# Patient Record
Sex: FEMALE | Race: ASIAN | NOT HISPANIC OR LATINO | URBAN - METROPOLITAN AREA
[De-identification: names, ages, dates, MRNs, and addresses within clinical notes are randomized per-mention and may not be internally consistent; named-entity substitution may affect disease eponyms.]

---

## 2019-08-15 ENCOUNTER — EMERGENCY (EMERGENCY)
Facility: HOSPITAL | Age: 39
LOS: 1 days | Discharge: HOME | End: 2019-08-15
Attending: EMERGENCY MEDICINE
Payer: MEDICAID

## 2019-08-15 VITALS
TEMPERATURE: 99 F | DIASTOLIC BLOOD PRESSURE: 68 MMHG | OXYGEN SATURATION: 100 % | HEART RATE: 73 BPM | RESPIRATION RATE: 18 BRPM | SYSTOLIC BLOOD PRESSURE: 101 MMHG

## 2019-08-15 VITALS
HEIGHT: 63 IN | HEART RATE: 83 BPM | TEMPERATURE: 98 F | WEIGHT: 110.01 LBS | SYSTOLIC BLOOD PRESSURE: 101 MMHG | DIASTOLIC BLOOD PRESSURE: 69 MMHG | OXYGEN SATURATION: 99 % | RESPIRATION RATE: 18 BRPM

## 2019-08-15 LAB
ALBUMIN SERPL ELPH-MCNC: 4.3 G/DL — SIGNIFICANT CHANGE UP (ref 3.3–5)
ALP SERPL-CCNC: 56 U/L — SIGNIFICANT CHANGE UP (ref 40–120)
ALT FLD-CCNC: 12 U/L — SIGNIFICANT CHANGE UP (ref 10–45)
ANION GAP SERPL CALC-SCNC: 9 MMOL/L — SIGNIFICANT CHANGE UP (ref 5–17)
APPEARANCE UR: CLEAR — SIGNIFICANT CHANGE UP
AST SERPL-CCNC: 25 U/L — SIGNIFICANT CHANGE UP (ref 10–40)
BACTERIA # UR AUTO: NEGATIVE — SIGNIFICANT CHANGE UP
BASOPHILS # BLD AUTO: 0 K/UL — SIGNIFICANT CHANGE UP (ref 0–0.2)
BASOPHILS NFR BLD AUTO: 0.5 % — SIGNIFICANT CHANGE UP (ref 0–2)
BILIRUB SERPL-MCNC: 0.5 MG/DL — SIGNIFICANT CHANGE UP (ref 0.2–1.2)
BILIRUB UR-MCNC: NEGATIVE — SIGNIFICANT CHANGE UP
BUN SERPL-MCNC: 13 MG/DL — SIGNIFICANT CHANGE UP (ref 7–23)
CALCIUM SERPL-MCNC: 9.3 MG/DL — SIGNIFICANT CHANGE UP (ref 8.4–10.5)
CHLORIDE SERPL-SCNC: 104 MMOL/L — SIGNIFICANT CHANGE UP (ref 96–108)
CO2 SERPL-SCNC: 23 MMOL/L — SIGNIFICANT CHANGE UP (ref 22–31)
COLOR SPEC: YELLOW — SIGNIFICANT CHANGE UP
CREAT SERPL-MCNC: 0.51 MG/DL — SIGNIFICANT CHANGE UP (ref 0.5–1.3)
DIFF PNL FLD: ABNORMAL
EOSINOPHIL # BLD AUTO: 0.1 K/UL — SIGNIFICANT CHANGE UP (ref 0–0.5)
EOSINOPHIL NFR BLD AUTO: 1.7 % — SIGNIFICANT CHANGE UP (ref 0–6)
EPI CELLS # UR: 3 — SIGNIFICANT CHANGE UP
GLUCOSE SERPL-MCNC: 92 MG/DL — SIGNIFICANT CHANGE UP (ref 70–99)
GLUCOSE UR QL: NEGATIVE — SIGNIFICANT CHANGE UP
HCT VFR BLD CALC: 38.2 % — SIGNIFICANT CHANGE UP (ref 34.5–45)
HGB BLD-MCNC: 12.5 G/DL — SIGNIFICANT CHANGE UP (ref 11.5–15.5)
HYALINE CASTS # UR AUTO: 0 /LPF — SIGNIFICANT CHANGE UP (ref 0–7)
KETONES UR-MCNC: NEGATIVE — SIGNIFICANT CHANGE UP
LEUKOCYTE ESTERASE UR-ACNC: SIGNIFICANT CHANGE UP
LIDOCAIN IGE QN: 22 U/L — SIGNIFICANT CHANGE UP (ref 7–60)
LYMPHOCYTES # BLD AUTO: 1.5 K/UL — SIGNIFICANT CHANGE UP (ref 1–3.3)
LYMPHOCYTES # BLD AUTO: 30.7 % — SIGNIFICANT CHANGE UP (ref 13–44)
MCHC RBC-ENTMCNC: 29.4 PG — SIGNIFICANT CHANGE UP (ref 27–34)
MCHC RBC-ENTMCNC: 32.8 GM/DL — SIGNIFICANT CHANGE UP (ref 32–36)
MCV RBC AUTO: 89.7 FL — SIGNIFICANT CHANGE UP (ref 80–100)
MONOCYTES # BLD AUTO: 0.4 K/UL — SIGNIFICANT CHANGE UP (ref 0–0.9)
MONOCYTES NFR BLD AUTO: 7.3 % — SIGNIFICANT CHANGE UP (ref 2–14)
NEUTROPHILS # BLD AUTO: 3 K/UL — SIGNIFICANT CHANGE UP (ref 1.8–7.4)
NEUTROPHILS NFR BLD AUTO: 59.7 % — SIGNIFICANT CHANGE UP (ref 43–77)
NITRITE UR-MCNC: NEGATIVE — SIGNIFICANT CHANGE UP
PH UR: 6 — SIGNIFICANT CHANGE UP (ref 5–8)
PLATELET # BLD AUTO: 196 K/UL — SIGNIFICANT CHANGE UP (ref 150–400)
POTASSIUM SERPL-MCNC: 4.4 MMOL/L — SIGNIFICANT CHANGE UP (ref 3.5–5.3)
POTASSIUM SERPL-SCNC: 4.4 MMOL/L — SIGNIFICANT CHANGE UP (ref 3.5–5.3)
PROT SERPL-MCNC: 7.2 G/DL — SIGNIFICANT CHANGE UP (ref 6–8.3)
PROT UR-MCNC: ABNORMAL
RBC # BLD: 4.26 M/UL — SIGNIFICANT CHANGE UP (ref 3.8–5.2)
RBC # FLD: 12.9 % — SIGNIFICANT CHANGE UP (ref 10.3–14.5)
RBC CASTS # UR COMP ASSIST: 30 /HPF — HIGH (ref 0–4)
SODIUM SERPL-SCNC: 136 MMOL/L — SIGNIFICANT CHANGE UP (ref 135–145)
SP GR SPEC: 1.02 — SIGNIFICANT CHANGE UP (ref 1.01–1.02)
UROBILINOGEN FLD QL: NEGATIVE — SIGNIFICANT CHANGE UP
WBC # BLD: 5 K/UL — SIGNIFICANT CHANGE UP (ref 3.8–10.5)
WBC # FLD AUTO: 5 K/UL — SIGNIFICANT CHANGE UP (ref 3.8–10.5)
WBC UR QL: 2 /HPF — SIGNIFICANT CHANGE UP (ref 0–5)

## 2019-08-15 PROCEDURE — 99284 EMERGENCY DEPT VISIT MOD MDM: CPT | Mod: 25

## 2019-08-15 PROCEDURE — 76705 ECHO EXAM OF ABDOMEN: CPT | Mod: 26

## 2019-08-15 PROCEDURE — 99284 EMERGENCY DEPT VISIT MOD MDM: CPT

## 2019-08-15 PROCEDURE — 81001 URINALYSIS AUTO W/SCOPE: CPT

## 2019-08-15 PROCEDURE — 76705 ECHO EXAM OF ABDOMEN: CPT

## 2019-08-15 PROCEDURE — 87086 URINE CULTURE/COLONY COUNT: CPT

## 2019-08-15 PROCEDURE — 85027 COMPLETE CBC AUTOMATED: CPT

## 2019-08-15 PROCEDURE — 74176 CT ABD & PELVIS W/O CONTRAST: CPT | Mod: 26

## 2019-08-15 PROCEDURE — 74176 CT ABD & PELVIS W/O CONTRAST: CPT

## 2019-08-15 PROCEDURE — 96374 THER/PROPH/DIAG INJ IV PUSH: CPT

## 2019-08-15 PROCEDURE — 83690 ASSAY OF LIPASE: CPT

## 2019-08-15 PROCEDURE — 80053 COMPREHEN METABOLIC PANEL: CPT

## 2019-08-15 RX ORDER — KETOROLAC TROMETHAMINE 30 MG/ML
15 SYRINGE (ML) INJECTION ONCE
Refills: 0 | Status: DISCONTINUED | OUTPATIENT
Start: 2019-08-15 | End: 2019-08-15

## 2019-08-15 RX ADMIN — Medication 15 MILLIGRAM(S): at 19:42

## 2019-08-15 NOTE — ED PROVIDER NOTE - OBJECTIVE STATEMENT
39 year old F with pmhx of kidney stones and no psxh presents to ED c/o worsening R back pain and dysuria x5 days. +nausea. During onset, pt felt bilateral back pain, but is now mostly on the right. Denies fever, chills, vomiting, discolored urine, CP, dizziness, changes in BM, and decreased PO intake. Took a Motrin x2 days ago with mild relief.  No daily meds.

## 2019-08-15 NOTE — ED PROVIDER NOTE - NSFOLLOWUPINSTRUCTIONS_ED_ALL_ED_FT
Please see your primary doctor in 2-3 days for follow-up care. Return to ER for any new or worsening symptoms.    Back Pain    Back pain is very common in adults. The cause of back pain is rarely dangerous and the pain often gets better over time. The cause of your back pain may not be known and may include strain of muscles or ligaments, degeneration of the spinal disks, or arthritis. Occasionally the pain may radiate down your leg(s). Over-the-counter medicines to reduce pain and inflammation are often the most helpful. Stretching and remaining active frequently helps the healing process.     Low Back Strain  A strain is a stretch or tear in a muscle or the strong cords of tissue that attach muscle to bone (tendons). Strains of the lower back (lumbar spine) are a common cause of low back pain. A strain occurs when muscles or tendons are torn or are stretched beyond their limits. The muscles may become inflamed, resulting in pain and sudden muscle tightening (spasms). A strain can happen suddenly due to an injury (trauma), or it can develop gradually due to overuse.    What increases the risk?  The following factors may increase your risk of getting this condition:  Playing contact sports.  Participating in sports or activities that put excessive stress on the back and require a lot of bending and twisting, including:  Lifting weights or heavy objects, Gymnastics, Soccer, Figure skating, Snowboarding, Being overweight or obese, Having poor strength and flexibility.    What are the signs or symptoms?  Symptoms of this condition may include:  Sharp or dull pain in the lower back that does not go away. Pain may extend to the buttocks.  Stiffness.  Limited range of motion.  Inability to stand up straight due to stiffness or pain.  Muscle spasms.    How is this diagnosed?  This condition may be diagnosed based on:  Your symptoms, Your medical history, A physical exam, Your health care provider may push on certain areas of your back to determine the source of your pain. You may be asked to bend forward, backward, and side to side to assess the severity of your pain and your range of motion.  Imaging tests, such as:  X-rays, MRI.    How is this treated?  Treatment for this condition may include:  Applying heat and cold to the affected area.  Medicines to help relieve pain and to relax your muscles (muscle relaxants).  NSAIDs to help reduce swelling and discomfort.  Physical therapy.  When your symptoms improve, it is important to gradually return to your normal routine as soon as possible to reduce pain, avoid stiffness, and avoid loss of muscle strength. Generally, symptoms should improve within 6 weeks of treatment. However, recovery time varies.    Follow these instructions at home:  Managing pain, stiffness, and swelling     If directed, apply ice to the injured area during the first 24 hours after your injury.  Put ice in a plastic bag.  Place a towel between your skin and the bag.  Leave the ice on for 20 minutes, 2–3 times a day.  If directed, apply heat to the affected area as often as told by your health care provider. Use the heat source that your health care provider recommends, such as a moist heat pack or a heating pad.  Place a towel between your skin and the heat source.  Leave the heat on for 20–30 minutes.  Remove the heat if your skin turns bright red. This is especially important if you are unable to feel pain, heat, or cold. You may have a greater risk of getting burned.  Activity     Rest and return to your normal activities as told by your health care provider. Ask your health care provider what activities are safe for you.  Avoid activities that take a lot of effort (are strenuous) for as long as told by your health care provider.  Do exercises as told by your health care provider.  General instructions     Take over-the-counter and prescription medicines only as told by your health care provider.  If you have questions or concerns about safety while taking pain medicine, talk with your health care provider.  Do not drive or operate heavy machinery until you know how your pain medicine affects you.  Do not use any tobacco products, such as cigarettes, chewing tobacco, and e-cigarettes. Tobacco can delay bone healing. If you need help quitting, ask your health care provider.  Keep all follow-up visits as told by your health care provider. This is important.  How is this prevented?  Image Image Image Image Image   Warm up and stretch before being active.  Cool down and stretch after being active.  Give your body time to rest between periods of activity.  Avoid:  Being physically inactive for long periods at a time.  Exercising or playing sports when you are tired or in pain.  Use correct form when playing sports and lifting heavy objects.  Use good posture when sitting and standing.  Maintain a healthy weight.  Sleep on a mattress with medium firmness to support your back.  Make sure to use equipment that fits you, including shoes that fit well.  Be safe and responsible while being active to avoid falls.  Do at least 150 minutes of moderate-intensity exercise each week, such as brisk walking or water aerobics. Try a form of exercise that takes stress off your back, such as swimming or stationary cycling.  Maintain physical fitness, including:  Strength.  Flexibility.  Cardiovascular fitness.  Endurance.  Contact a health care provider if:  Your back pain does not improve after 6 weeks of treatment.  Your symptoms get worse.  Get help right away if:  Your back pain is severe.  You are unable to stand or walk.  You develop pain in your legs.  You develop weakness in your buttocks or legs.  You have difficulty controlling when you urinate or when you have a bowel movement.  This information is not intended to replace advice given to you by your health care provider. Make sure you discuss any questions you have with your health care provider.      SEEK IMMEDIATE MEDICAL CARE IF YOU HAVE ANY OF THE FOLLOWING SYMPTOMS: bowel or bladder control problems, unusual weakness or numbness in your arms or legs, nausea or vomiting, abdominal pain, fever, dizziness/lightheadedness.

## 2019-08-15 NOTE — ED ADULT NURSE NOTE - NSIMPLEMENTINTERV_GEN_ALL_ED
Implemented All Universal Safety Interventions:  [place] to call system. Call bell, personal items and telephone within reach. Instruct patient to call for assistance. Room bathroom lighting operational. Non-slip footwear when patient is off stretcher. Physically safe environment: no spills, clutter or unnecessary equipment. Stretcher in lowest position, wheels locked, appropriate side rails in place.

## 2019-08-15 NOTE — ED PROVIDER NOTE - ATTENDING CONTRIBUTION TO CARE
39F, pmh kidney stones, presents with R back pain, dysuria x 5 days. Nausea, no vomiting. Pain initially bilateral back, now isolated to R. non-radiating, no provoking or alleviating factors. No f/c, hematuria, urinary frequency. No cp, sob, cough, congestion. Did take Motrin with mild relief in pain. No LE numbness or weakness. No incontinence. No trauma.    PE: NAD, NCAT, MMM, Trachea midline, Normal conjunctiva, lungs CTAB, S1/S2 RRR, Normal perfusion, 2+ radial pulses bilat, Abdomen Soft, +RUQ ttp, neg randall's, No rebound/guarding, +R CVA ttp, No LE edema, No deformity of extremities, No rashes,  No focal motor or sensory deficits.     Pt appears well. Ddx includes but not limited to kidney stone vs pyelonephritis vs GB pathology vs msk strain. Obtain labs, RUQ US, CT a/p, urine. Give analgesia as needed. Re-eval. - Hans Christine MD

## 2019-08-15 NOTE — ED ADULT NURSE NOTE - OBJECTIVE STATEMENT
39 year old A&Ox3 female presents to ED with steady coordinated gait complaining of right flank pain and burning upon urination x days. Denies PMH. Denies hematuria, +right sided CVA tenderness. Abdomen soft, nondistended, nontender to palpation. Denies CP, SOB, n/v/d, fevers, weakness, fatigue, numbness, tingling in upper and lower extremities, HA, blurry vision. VSS updated on plan of care.

## 2019-08-15 NOTE — ED PROVIDER NOTE - PROGRESS NOTE DETAILS
Pt assessed at beside. Pt resting comfortably, pain controlled, pt questions answered. Vital signs stable.     Gildardo Urena, PGY1

## 2019-08-15 NOTE — ED PROVIDER NOTE - CLINICAL SUMMARY MEDICAL DECISION MAKING FREE TEXT BOX
39F no significant pmhx presenting with x5days of flank pain (right sided). + nausea PE: vitals stable, +CVA tenderness on right, +Dora. Ddx: RUQ/flank pain 2/2 to Pyelo vs Ady. Plan: Labs, UA/UC, US gallbladder

## 2019-08-16 LAB
CULTURE RESULTS: SIGNIFICANT CHANGE UP
SPECIMEN SOURCE: SIGNIFICANT CHANGE UP

## 2020-12-04 ENCOUNTER — EMERGENCY (EMERGENCY)
Facility: HOSPITAL | Age: 40
LOS: 1 days | Discharge: HOME | End: 2020-12-04
Attending: EMERGENCY MEDICINE
Payer: MEDICAID

## 2020-12-04 VITALS
WEIGHT: 130.07 LBS | DIASTOLIC BLOOD PRESSURE: 70 MMHG | OXYGEN SATURATION: 98 % | HEIGHT: 63 IN | RESPIRATION RATE: 18 BRPM | TEMPERATURE: 98 F | HEART RATE: 85 BPM | SYSTOLIC BLOOD PRESSURE: 105 MMHG

## 2020-12-04 PROBLEM — N20.0 CALCULUS OF KIDNEY: Chronic | Status: ACTIVE | Noted: 2019-08-18

## 2020-12-04 PROCEDURE — 99282 EMERGENCY DEPT VISIT SF MDM: CPT

## 2020-12-04 NOTE — ED PROVIDER NOTE - CLINICAL SUMMARY MEDICAL DECISION MAKING FREE TEXT BOX
see MD note ------------ATTENDING NOTE------------   RHD female w/o week of increased mild dull ache in RUE, describes intermittent episodes over past years, worse w/ increased activities homemaking, no red flags by Hx/PE for more serious process at this time, no systemic symptoms, c/w overuse -vs- tendonitis/bursitis/OA, in depth dw pt about ddx, tx, crane, continued close outpt fu.  - Joel Fairchild MD   ----------------------------------------------

## 2020-12-04 NOTE — ED PROVIDER NOTE - NSFOLLOWUPCLINICS_GEN_ALL_ED_FT
[facility]  Sports Medicine  [address] [place] [address]  Phone: [phone]  Fax:   Follow Up Time: Urgent

## 2020-12-04 NOTE — ED PROVIDER NOTE - PATIENT PORTAL LINK FT
You can access the FollowMyHealth Patient Portal offered by [facility] by registering at the following website: [email or web address]. By joining [email or web address]’s [email or web address] portal, you will also be able to view your health information using other applications (apps) compatible with our system.

## 2020-12-04 NOTE — ED PROVIDER NOTE - PHYSICAL EXAMINATION
RUE nvi w/ bcr distally, (-) edema, nml color, +2 radial pulse, (-) joint swelling/redness, no frank tenderness/deformity    Well Appearing, Nontoxic, NAD;  Symm Facies, PERRL 3mm, (-)Pallor, Anicteric, MMM;  RRR w/o m/g/r;   CTAB w/o w/r/r;   Abd soft, nt/nd, +bs;  No rash;  AOX3, Normal speech, normal strength/sensation/gait

## 2020-12-04 NOTE — ED PROVIDER NOTE - NS ED ROS FT
(+) R arm aches  (-) fever, trauma, edema, numbness/tingling, weakness, joint swelling/redness, chest pain, sob/dyspnea

## 2020-12-04 NOTE — ED PROVIDER NOTE - NSFOLLOWUPINSTRUCTIONS_ED_ALL_ED_FT
See Sports Medicine Clinic next week for follow up -- call to discuss.    Use Acetaminophen and Ibuprofen as directed for pain -- see medication warnings.    See Overuse Syndrome information and return instructions given to you.    Seek immediate medical care for new / worsening symptoms / concerns.

## 2020-12-04 NOTE — ED ADULT NURSE NOTE - OBJECTIVE STATEMENT
40 year old ambulatory female presents to ED c/o R arm pain x 5 years with increased pain x 1 week after cleaning her house. No swelling, discoloration or deformity noted. Patient has full ROM (+) radial pulses with some tenderness noted to lateral wrist area. Denies chest pain or sob. No other complaints. Patient given ice packs

## 2020-12-08 ENCOUNTER — APPOINTMENT (OUTPATIENT)
Age: 40
End: 2020-12-08
Payer: MEDICAID

## 2020-12-08 DIAGNOSIS — Z78.9 OTHER SPECIFIED HEALTH STATUS: ICD-10-CM

## 2020-12-08 DIAGNOSIS — M25.511 PAIN IN RIGHT SHOULDER: ICD-10-CM

## 2020-12-08 DIAGNOSIS — M25.531 PAIN IN RIGHT WRIST: ICD-10-CM

## 2020-12-08 PROCEDURE — 99072 ADDL SUPL MATRL&STAF TM PHE: CPT

## 2020-12-08 PROCEDURE — 99205 OFFICE O/P NEW HI 60 MIN: CPT | Mod: 25

## 2020-12-08 PROCEDURE — 20600 DRAIN/INJ JOINT/BURSA W/O US: CPT

## 2020-12-08 NOTE — DISCUSSION/SUMMARY
[de-identified] : 40 F presenting for right radial side wrist pain radiating to right shoulder x 1 week. 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 Shoulder neck Impression: 1) shoulder bursitis 2) cervical neuropathy. CSI performed on right wrist today for intersection syndrome. Plan for PT of wrist and follow up in 1 month. For shoulder XR performed showing no fracture or OA. Plan for PT of shoulder and follow up in 4 weeks for reassessment. If pain of shoulder persists will consider MRI. Motrin/Tylenol for pain control explained to patient.

## 2020-12-08 NOTE — REASON FOR VISIT
[Shoulder Pain] : shoulder pain [FreeTextEntry2] : Right neck and right wrist pain [Initial Visit] : an initial visit for

## 2020-12-08 NOTE — HISTORY OF PRESENT ILLNESS
[de-identified] : The patient is a 40 year old RHD female with no hx presenting for right arm pain starting at right wrist and radiating up to shoulder and neck x 1 week. Patient notes these symptoms have occurred in past but have not lasted this long. No trauma. Notes occasional numbness, tingling but no weakness. Worse pain in shoulder and wrist at night. No fever. No weight loss.

## 2020-12-08 NOTE — PROCEDURE
[de-identified] : Attending note. Using ultrasound guidance the intersection of the first and second dorsal compartments was injected with 1 cc of Depo-Medrol +1 cc of plain lidocaine with relief the patient's symptoms in the wrist. [Injection] : Injection [Right] : on the right.   [Inflammation] : Inflammation [Patient] : patient [Risk] : Risk [Benefits] : benefits [Alternatives] : alternatives [Bleeding] : bleeding [Infection] : infection [Allergic Reaction] : allergic reaction [Verbal Consent Obtained] : verbal consent was obtained prior to the procedure [Alcohol] : Alcohol [Betadine] : Betadine [Ethyl Chloride Spray] : ethyl chloride spray was used as a topical anesthetic [Direct] : direct [22] : a 22-gauge [1% Lidocaine___(mL)] : [unfilled] mL of 1% Lidocaine [Methylpred. 40mg/mL___(mL)] : [unfilled] mL 40mg/mL methylprednisolone [Bandage Applied] : a bandage [Tolerated Well] : The patient tolerated the procedure well [None] : None [___ Week(s)] : in [unfilled] week(s) [de-identified] : wrist/APL

## 2020-12-08 NOTE — PHYSICAL EXAM
[de-identified] : Attending note. Point of care ultrasound of the intersection of the first and second dorsal compartments reveals significant edema in the tendon sheath with tenderness. X-ray of the right shoulder reveals possible calcific bursitis. There is no arthritis. [de-identified] : General Appearance: Well-nourished, well developed in no acute distress\par Orientation: Oriented to person, place and time.             Mood / Affect: Calm\par Gait: normal           Coordination: normal\par Shoulder Exam (Bilateral)\par Inspection/Palpation UE (R/L): tenderness over trapezius on right side. No effusion or erythema or warmth b/l. \par Limited internal and external ROM of right shoulder 2/2 pain. No limitation of left shoulder. \par Cross Arm (R/L): neg / neg                       Neer Impingement Test (R/L): neg / neg\par Huggins Test (R/L): neg / neg                  Scapulothoracic motion (R/L): 2:1 / 2:1\par Tattnall's Test (R/L): neg / neg                 Yergusons Test (R/L): neg / neg\par Speeds (R/L): neg / neg                       \par Abduction (R/L): 5/5 / 5/5         ER(R/L): 5/5 / 5/5              IR (R/L): 5/5 / 5/5\par Biceps (R/L):5/5 / 5/5               Triceps (R/L):5/5 / 5/5       Intrinsics (R/L): 5/5 / 5/5\par Sensation: Subjective normal median / ulnar / radial / axillary sensation bilaterally\par Vasculature: 2+ radial pulse bilaterally.\par UE Skin (R/L): No rashes or lesions.\par DTR UE (R/L): Biceps: (2+/2+); Triceps: (2+/2+)\par C-spine Flexion: 45                  C-spine Extension: 45\par C-spine Right Rotation: 70       C-spine Left Rotation: 70\par C-Spine Tenderness: non-tender          Spurling's Test (R/L): neg / neg\par Wrist / Hand Exam (Bilateral)\par Inspection/Palpation (R/L): tenderness over radial aspect of right wrist with mild swelling noted over radial aspect of wrist. \par Wrist flexion nl b/l. Wrist extension nl b/l. Finklstein positive on right side. \par Hand Function: Able to A-OK, Hook horns, cross fingers, thumbs up bilaterally\par Sensation: Subjective normal median, ulnar, radial and axillary sensation bilaterally\par Vasculature: 2+ radial pulse bilaterally\par

## 2020-12-08 NOTE — DISCUSSION/SUMMARY
[de-identified] : 40 F presenting for right radial side wrist pain radiating to right shoulder x 1 week. 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 Shoulder neck Impression: 1) shoulder bursitis 2) cervical neuropathy. CSI performed on right wrist today for intersection syndrome. Plan for PT of wrist and follow up in 1 month. For shoulder XR performed showing no fracture or OA. Plan for PT of shoulder and follow up in 4 weeks for reassessment. If pain of shoulder persists will consider MRI. Motrin/Tylenol for pain control explained to patient.

## 2020-12-08 NOTE — HISTORY OF PRESENT ILLNESS
[de-identified] : The patient is a 40 year old RHD female with no hx presenting for right arm pain starting at right wrist and radiating up to shoulder and neck x 1 week. Patient notes these symptoms have occurred in past but have not lasted this long. No trauma. Notes occasional numbness, tingling but no weakness. Worse pain in shoulder and wrist at night. No fever. No weight loss.

## 2020-12-08 NOTE — REVIEW OF SYSTEMS
[Arthralgia] : no arthralgia [Joint Pain] : joint pain [Joint Stiffness] : no joint stiffness [Joint Swelling] : no joint swelling [Negative] : Constitutional [FreeTextEntry9] : right arm pain

## 2020-12-08 NOTE — PHYSICAL EXAM
[de-identified] : Attending note. Point of care ultrasound of the intersection of the first and second dorsal compartments reveals significant edema in the tendon sheath with tenderness. X-ray of the right shoulder reveals possible calcific bursitis. There is no arthritis. [de-identified] : General Appearance: Well-nourished, well developed in no acute distress\par Orientation: Oriented to person, place and time.             Mood / Affect: Calm\par Gait: normal           Coordination: normal\par Shoulder Exam (Bilateral)\par Inspection/Palpation UE (R/L): tenderness over trapezius on right side. No effusion or erythema or warmth b/l. \par Limited internal and external ROM of right shoulder 2/2 pain. No limitation of left shoulder. \par Cross Arm (R/L): neg / neg                       Neer Impingement Test (R/L): neg / neg\par Huggins Test (R/L): neg / neg                  Scapulothoracic motion (R/L): 2:1 / 2:1\par Millard's Test (R/L): neg / neg                 Yergusons Test (R/L): neg / neg\par Speeds (R/L): neg / neg                       \par Abduction (R/L): 5/5 / 5/5         ER(R/L): 5/5 / 5/5              IR (R/L): 5/5 / 5/5\par Biceps (R/L):5/5 / 5/5               Triceps (R/L):5/5 / 5/5       Intrinsics (R/L): 5/5 / 5/5\par Sensation: Subjective normal median / ulnar / radial / axillary sensation bilaterally\par Vasculature: 2+ radial pulse bilaterally.\par UE Skin (R/L): No rashes or lesions.\par DTR UE (R/L): Biceps: (2+/2+); Triceps: (2+/2+)\par C-spine Flexion: 45                  C-spine Extension: 45\par C-spine Right Rotation: 70       C-spine Left Rotation: 70\par C-Spine Tenderness: non-tender          Spurling's Test (R/L): neg / neg\par Wrist / Hand Exam (Bilateral)\par Inspection/Palpation (R/L): tenderness over radial aspect of right wrist with mild swelling noted over radial aspect of wrist. \par Wrist flexion nl b/l. Wrist extension nl b/l. Finklstein positive on right side. \par Hand Function: Able to A-OK, Hook horns, cross fingers, thumbs up bilaterally\par Sensation: Subjective normal median, ulnar, radial and axillary sensation bilaterally\par Vasculature: 2+ radial pulse bilaterally\par

## 2020-12-18 ENCOUNTER — RESULT REVIEW (OUTPATIENT)
Age: 40
End: 2020-12-18

## 2021-03-26 ENCOUNTER — RESULT REVIEW (OUTPATIENT)
Age: 41
End: 2021-03-26

## 2023-05-25 NOTE — ED ADULT TRIAGE NOTE - ARRIVAL FROM
From: Annie Oshea  To: Yuliana Potter  Sent: 5/23/2023 8:07 PM EDT  Subject: Thyroid test response    Hello! I have been doing much better with my meds than in the past thanks to my new maggie I use, SO MUCH better :) but still not perfect. Is the number worse than last time, or better? If worse, it may need to increase then, but if better, I'm not sure.   Home

## 2023-07-03 ENCOUNTER — EMERGENCY (EMERGENCY)
Facility: HOSPITAL | Age: 43
LOS: 1 days | Discharge: HOME | End: 2023-07-03
Attending: STUDENT IN AN ORGANIZED HEALTH CARE EDUCATION/TRAINING PROGRAM
Payer: MEDICAID

## 2023-07-03 VITALS
DIASTOLIC BLOOD PRESSURE: 60 MMHG | TEMPERATURE: 98 F | SYSTOLIC BLOOD PRESSURE: 101 MMHG | RESPIRATION RATE: 19 BRPM | OXYGEN SATURATION: 98 %

## 2023-07-03 VITALS
DIASTOLIC BLOOD PRESSURE: 59 MMHG | OXYGEN SATURATION: 98 % | RESPIRATION RATE: 18 BRPM | TEMPERATURE: 98 F | HEART RATE: 86 BPM | WEIGHT: 130.07 LBS | SYSTOLIC BLOOD PRESSURE: 97 MMHG

## 2023-07-03 PROCEDURE — 72125 CT NECK SPINE W/O DYE: CPT | Mod: 26,MA

## 2023-07-03 PROCEDURE — 99284 EMERGENCY DEPT VISIT MOD MDM: CPT | Mod: 25

## 2023-07-03 PROCEDURE — 93971 EXTREMITY STUDY: CPT | Mod: 26,RT

## 2023-07-03 PROCEDURE — 72125 CT NECK SPINE W/O DYE: CPT | Mod: MA

## 2023-07-03 PROCEDURE — 99284 EMERGENCY DEPT VISIT MOD MDM: CPT

## 2023-07-03 PROCEDURE — 73030 X-RAY EXAM OF SHOULDER: CPT | Mod: 26,RT

## 2023-07-03 PROCEDURE — 73030 X-RAY EXAM OF SHOULDER: CPT

## 2023-07-03 PROCEDURE — 93971 EXTREMITY STUDY: CPT

## 2023-07-03 RX ORDER — CYCLOBENZAPRINE HYDROCHLORIDE 10 MG/1
5 TABLET, FILM COATED ORAL ONCE
Refills: 0 | Status: COMPLETED | OUTPATIENT
Start: 2023-07-03 | End: 2023-07-03

## 2023-07-03 RX ORDER — LIDOCAINE 4 G/100G
1 CREAM TOPICAL ONCE
Refills: 0 | Status: COMPLETED | OUTPATIENT
Start: 2023-07-03 | End: 2023-07-03

## 2023-07-03 RX ORDER — ACETAMINOPHEN 500 MG
975 TABLET ORAL ONCE
Refills: 0 | Status: COMPLETED | OUTPATIENT
Start: 2023-07-03 | End: 2023-07-03

## 2023-07-03 RX ORDER — IBUPROFEN 200 MG
600 TABLET ORAL ONCE
Refills: 0 | Status: COMPLETED | OUTPATIENT
Start: 2023-07-03 | End: 2023-07-03

## 2023-07-03 RX ADMIN — Medication 600 MILLIGRAM(S): at 11:38

## 2023-07-03 RX ADMIN — Medication 600 MILLIGRAM(S): at 13:00

## 2023-07-03 RX ADMIN — Medication 975 MILLIGRAM(S): at 13:00

## 2023-07-03 RX ADMIN — Medication 975 MILLIGRAM(S): at 11:38

## 2023-07-03 RX ADMIN — LIDOCAINE 1 PATCH: 4 CREAM TOPICAL at 16:11

## 2023-07-03 NOTE — ED ADULT NURSE NOTE - OBJECTIVE STATEMENT
Patient c/o pain in right wrist radiating to right shoulder x 2-3 days after lifting heavy suitcase. Patient reports that pain was initially only in wrist but starting yesterday started to feel pain in right shoulder and back. Patient reports taking motrin for pain with no relief. Patient has full ROM, no deformity or obvious injury noted. Patient A&Ox4, no signs of acute distress. plan of care in progress.

## 2023-07-03 NOTE — ED ADULT NURSE NOTE - NSFALLUNIVINTERV_ED_ALL_ED
Bed/Stretcher in lowest position, wheels locked, appropriate side rails in place/Call bell, personal items and telephone in reach/Instruct patient to call for assistance before getting out of bed/chair/stretcher/Non-slip footwear applied when patient is off stretcher/[place] to call system/Physically safe environment - no spills, clutter or unnecessary equipment/Purposeful proactive rounding/Room/bathroom lighting operational, light cord in reach

## 2023-07-03 NOTE — ED PROVIDER NOTE - CLINICAL SUMMARY MEDICAL DECISION MAKING FREE TEXT BOX
43-year-old female with history of kidney stones, presenting to the emergency department for 3 days of diffuse right arm pain which began after pulling a suitcase. Also descrides pain in R side of neck. Reports associated decrease in sensation, greatest over right medial and lateral wrist.  States she has pain with movement of joints throughout the right arm.  States the pain is constant.  Also describing associated swelling in the right upper extremity. No traumatic injury, fall, or head strike. Exam w/ 1+ edema to RUE and diffuse pain. RUE weakness on exam, and subjective decreased sensation greatest at medial and lat wrist. Concern for weakness in RUE. Consider primary cervical radiculopathy. Low concern for fx given no trauma. Consider msk strain/sprain. Consider DVT given swelling and pain distribution. NO signs of infectious process on exam. Upreg, XR, CT C spine, US duplex, will reassess.

## 2023-07-03 NOTE — ED PROVIDER NOTE - OBJECTIVE STATEMENT
43 F with no significant past medical history presenting to emergency department with right upper extremity pain, numbness, tingling, weakness that began 3 days ago after lifting heavy luggage.  Patient felt pain in shoulder immediately upon lifting luggage.  This pain radiates from right side of neck/right shoulder down to all fingertips.  Also feeling weaker in right arm.  Has been taking Advil for symptoms without any significant relief of pain.  Also noted some increased swelling in the RUE starting yesterday which she states has been self resolving. Has not yet taken any pain medication today.  Denies fever, headache, vision change, chest pain, shortness of breath, abdominal pain, nausea, vomiting.  Not endorsing any pain to left upper extremity or bilateral lower extremities.  Patient is right-hand dominant.  No prior injuries to right upper extremity.

## 2023-07-03 NOTE — ED PROVIDER NOTE - PATIENT PORTAL LINK FT
You can access the FollowMyHealth Patient Portal offered by [facility] by registering at the following website: [email or web address]. By joining [employer]’s FollowMyHealth portal, you will also be able to view your health information using other applications (apps) compatible with our system.

## 2023-07-03 NOTE — ED PROVIDER NOTE - PHYSICAL EXAMINATION
Gen: WDWN, NAD  HEENT: EOMI, PERRLA, mucous membranes moist  CV: RRR, +S1/S2, no M/R/G, 2+ radial pulses b/l  Resp: CTAB, no W/R/R, no accessory muscle use, no increased work of breathing  GI: Abdomen soft non-distended, NTTP  MSK: No midline spinal tenderness. Full ROM of neck. +TTP over anterior humeral head. +Spurling test with rightward head movement. Decreased strength in right upper extremity compared to left. Decreased subjective sensation to Right lateral wrist. Radial pulses 2+ b/l  Neuro: A&Ox4, following commands, moving all four extremities spontaneously  Psych: appropriate mood

## 2023-07-10 ENCOUNTER — APPOINTMENT (OUTPATIENT)
Dept: ORTHOPEDIC SURGERY | Facility: CLINIC | Age: 43
End: 2023-07-10

## 2024-02-21 ENCOUNTER — EMERGENCY (EMERGENCY)
Facility: HOSPITAL | Age: 44
LOS: 1 days | Discharge: HOME | End: 2024-02-21
Attending: STUDENT IN AN ORGANIZED HEALTH CARE EDUCATION/TRAINING PROGRAM
Payer: MEDICAID

## 2024-02-21 VITALS
HEART RATE: 86 BPM | DIASTOLIC BLOOD PRESSURE: 82 MMHG | OXYGEN SATURATION: 100 % | RESPIRATION RATE: 18 BRPM | TEMPERATURE: 98 F | WEIGHT: 130.07 LBS | SYSTOLIC BLOOD PRESSURE: 137 MMHG | HEIGHT: 60 IN

## 2024-02-21 PROCEDURE — 99284 EMERGENCY DEPT VISIT MOD MDM: CPT

## 2024-02-22 VITALS
HEART RATE: 93 BPM | DIASTOLIC BLOOD PRESSURE: 85 MMHG | RESPIRATION RATE: 16 BRPM | SYSTOLIC BLOOD PRESSURE: 134 MMHG | OXYGEN SATURATION: 99 % | TEMPERATURE: 98 F

## 2024-02-22 PROCEDURE — 99284 EMERGENCY DEPT VISIT MOD MDM: CPT | Mod: 25

## 2024-02-22 PROCEDURE — 73030 X-RAY EXAM OF SHOULDER: CPT

## 2024-02-22 PROCEDURE — 73030 X-RAY EXAM OF SHOULDER: CPT | Mod: 26,RT

## 2024-02-22 RX ORDER — LIDOCAINE 4 G/100G
1 CREAM TOPICAL ONCE
Refills: 0 | Status: COMPLETED | OUTPATIENT
Start: 2024-02-22 | End: 2024-02-22

## 2024-02-22 RX ORDER — KETOROLAC TROMETHAMINE 30 MG/ML
30 SYRINGE (ML) INJECTION ONCE
Refills: 0 | Status: DISCONTINUED | OUTPATIENT
Start: 2024-02-22 | End: 2024-02-22

## 2024-02-22 RX ORDER — ACETAMINOPHEN 500 MG
975 TABLET ORAL ONCE
Refills: 0 | Status: COMPLETED | OUTPATIENT
Start: 2024-02-22 | End: 2024-02-22

## 2024-02-22 RX ADMIN — Medication 975 MILLIGRAM(S): at 01:38

## 2024-02-22 RX ADMIN — LIDOCAINE 1 PATCH: 4 CREAM TOPICAL at 01:40

## 2024-02-22 NOTE — ED PROVIDER NOTE - PATIENT PORTAL LINK FT
You can access the FollowMyHealth Patient Portal offered by [facility] by registering at the following website: [email or web address]. By joining [employer]’s FollowMyHealth portal, you will also be able to view your health information using other applications (apps) compatible with our system.

## 2024-02-22 NOTE — ED ADULT NURSE NOTE - OBJECTIVE STATEMENT
44y F AxO x4 came in for right arm pain that began four days ago. Patient reports she does not know what caused the arm pain, denies trauma to the area. Patient's right arm has no signs of deformity, abrasion, contusion, or lacerations. Patient has full ROM and Sensation bilaterally. 44y F AxO x4 came in for right arm pain that began four days ago. Patient reports she does not know what caused the arm pain, denies trauma to the area. Patient's right arm has no signs of deformity, abrasion, contusion, or lacerations. Patient has full ROM and sensation bilaterally. Radial pulses are present and equal bilaterally. Patient is also endorsing numbness and tingling. Denies PMH. Denies fever, chills, headache, blurry vision, dizziness, n/v/d, dysuria, weakness, SOB, and chest pain. Family is present at bedside. Bed is in lowest position.

## 2024-02-22 NOTE — ED PROVIDER NOTE - CCCP TRG CHIEF CMPLNT
I spoke to Therese power of  and who is an  too. Patient is in the ICU at [facility]. I let Dr. Jane know. She said they would like records/notes from Dr. Jane's office so they can better diagnose here. Please fax to Dr. Villaseñor at [phone].        pain, arm

## 2024-02-22 NOTE — ED PROVIDER NOTE - CLINICAL SUMMARY MEDICAL DECISION MAKING FREE TEXT BOX
44-year-old female presenting to the ER for atraumatic right-sided arm and shoulder pain.  On exam, vital signs stable, some tenderness to palpation over the right trapezius muscle and some pain and range of motion of right shoulder.  Patient also has positive reverse phalen's test.   Intact throughout extremities.  Symptoms likely secondary to possible arthritis of the right shoulder versus may be carpal tunnel syndrome.  Possibly secondary to degenerative changes that were noted in patient's CT of the C-spine.  Patient has not had a chance to follow-up with the orthopedic recommendations yet.  Plan for pain control, given patient's concern we will order repeat x-ray of the shoulder, but do not suspect fracture given exam, and story.  Reassess to dispo.  Pain controlled, plan for outpatient follow-up with orthopedics and spine center.

## 2024-02-22 NOTE — ED PROVIDER NOTE - PHYSICAL EXAMINATION
Const: Well-nourished, Well-developed, appearing stated age.  Eyes: no conjunctival injection, and symmetrical lids.  HEENT: Head NCAT, no lesions. Atraumatic external nose and ears.   Neck: Symmetric, trachea midline.   CVS: +S1/S2, Radial and DP pulses 2+ b/l.   RESP: Unlabored respiratory effort. Clear to auscultation bilaterally.  GI: Nontender/Nondistended, No CVA tenderness b/l.   MSK: Normocephalic/Atraumatic.   Right-sided trapezius tenderness to palpation.  No midline tenderness of the C-spine.  No edema of the extremity.   able to range the right shoulder, but some pain and range of motion.  Skin: Warm, dry and intact.   Neuro: Fluent Speech. Moving all extremities. Sensation to lite touch intact in all extremities.    Psych: Awake, Alert, & Oriented (AAO) x3. Appropriate mood and affect.

## 2024-02-22 NOTE — ED ADULT NURSE NOTE - CHPI ED NUR RELIEVING FX
"[facility] MEDICINE PROGRESS NOTE     Attending: Dr. Erlinda Sotomayor  Resident: Dr. Gallegos    PATIENT: Victor Manuel Glasgow; [id number]; 1955; Hospital Day: 3    ID: 61 y.o. male admitted for new finding of metastatic masses, continues inpatient due to altered mental status    SUBJECTIVE: Overnight patient again agitated and altered. Received ativan x5, for agitation and for CIWA scores 7-13 (anxiety, agitation, tremor, confusion, etc). Somnolent in AM, sleeping with eyes open, few apneic episodes but no desaturations. Arousable when shaken or moved, says his back hurts, but falls right back to sleep.   On recheck in PM he is awake but disoriented, angry and says all his caregivers are \"liars\" but is being cooperative, calm. Would like to have a beer.    OBJECTIVE: /62 mmHg  Pulse 105  Temp(Src) 36.9 °C (98.5 °F)  Resp 17  Ht 1.829 m (6')  Wt 71.215 kg (157 lb)  BMI 21.29 kg/m2  SpO2 99%  Filed Vitals:    08/08/17 1558 08/08/17 1700 08/08/17 1800 08/08/17 1930   BP: 95/59   124/62   Pulse: 102   105   Temp: 36.6 °C (97.8 °F)   36.9 °C (98.5 °F)   Resp: 14   17   Height:       Weight:       SpO2: 91% 78% 94% 99%   Pulse Oximetry: 99 %, O2 (LPM): 0, O2 Delivery: Nasal Cannula    MEDS:    Current facility-administered medications:   •  lorazepam  •  lorazepam **OR** lorazepam  •  lorazepam **OR** lorazepam  •  lorazepam **OR** lorazepam  •  lorazepam **OR** lorazepam  •  lorazepam  •  lorazepam  •  0.9 % NaCl with KCl 20 mEq 1,000 mL  •  nicotine  •  haloperidol lactate  •  senna-docusate **AND** polyethylene glycol/lytes **AND** magnesium hydroxide **AND** bisacodyl  •  ondansetron  •  ondansetron  •  promethazine  •  promethazine  •  prochlorperazine  •  zolpidem  •  hydrocodone/acetaminophen    PE:  General: No acute distress, sleeping lying back in bed, awakens only to being shaken, disoriented to situation, falls back asleep  HEENT: Normocephalic, atraumatic. Mucous membranes dry, sleeping with mouth " open  Cardiovascular: RRR, normal S1/S2, no extra heart sounds appreciated  Respiratory: Symmetric chest rise. CTAB, moving air well.  Abdomen: normoactive bowel sounds, non-distended, soft, non-tender, no masses, no rebound  EXT: LAIRD, No C/C/E  Neuro: no gross focal deficits    LABS:  Lab Results   Component Value Date    WBC 14.9* 2017    HEMOGLOBIN 14.3 2017    HEMATOCRIT 41.9* 2017    PLATELETCT 220 2017    MCV 96.5 2017     Lab Results   Component Value Date    SODIUM 141 2017    POTASSIUM 3.5* 2017    CHLORIDE 105 2017    CO2 28 2017    BUN 46* 2017    CREATININE 1.89* 2017    GLUCOSE 86 2017     IMAGIN/08 CT head with findings only of atrophy, small vessel ischemia, prior lacunar infarct in right basal ganglia.     ASSESSMENT/PLAN:  Victor Manuel Glasgow is a 61 y.o. male with metastatic cancer of unknown primary, likely lung.    Metastatic disease  - CT Scan 2017 with growth that was seen in the left upper lobe, likely primary lung cancer. Mets were noted in liver, bone, and maybe adrenal glands. New diagnosis.  - alk phos and calcium elevated due to bony involvement  - 30lb weight loss and poor appetite  - head CT negative for intracranial mets  - Ddx: metastatic lung cancer, prostate cancer, or melanoma. Has not had screening tests.   - will plan for biopsy and then oncology involvement. Radiology to obtain and review outside records, recommend whether IR or pulm should obtain biopsy.   - home norco 10s for pain, available frequently. Can escalate if needed but patient has been somnolent    Altered mental status, alcohol withdrawal  - altered mentation confirmed by wife today and yesterday morning, poor judgement and insight, now (after ativan) with additional disorientation and lethargy  - likely due to alcohol withdrawal, less likely metabolic encephalopathy or wernickes   - no intracranial process on CT head without  contrast  - unsafe to make decision to leave AMA. Per wife may use restraints if needed   - better when wife and friends at bedside, continue redirection   - patient does not desire to quit drinking, so beer ordered for inpt   - CIWA protocol contraindicated when giving alcohol, so ativan can be given PRN    Alcoholism  - on admission stated 2-3 beers nightly, down from 6 pack  - today wife states 12 pack daily  - tachycardia started this morning, about 48 hours after last drink   - will give thiamine and multivitamin   - continue three beers daily, as above   - use ativan prn for withdrawal symptoms   - monitor closely for delirium tremens    Hypercalcemia  - Ca at 13.9 on outpatient labs, today is 12.8  - considered moderate elevation, not critical if chronic  - treatment with fluids, avoid high calcium diet or medications   - hold home HCTZ   - continue fluid at rate of 125 (patient would not leave IV in place last night)   - recheck in AM    Leukocytosis  - WBC up to 16 during admission, today 14  - unclear etiology. Vitals not consistent with sepsis. No clinical infections. Lactate normalized with fluids.   - blood cultures NGTD, UA to be collected    Renal Insufficiency  - Patient labs demonstrated Cr level of 2.6, today improved to 1.8  - unclear chronic or acute. Baseline unknown, but PCP notes do not mention chronic kidney disease   - rehydrate as above    Stable, chronic problems  HTN: normotensive today, hold lisinopril/HCTZ  HLD: stable, cont home statin at discharge     FEN/Prophy/Core    - IVF: NS for rehydration  - Electrolytes: hypercalcemia as above  - Diet: regular  - bowel regimen: per protocol  - DVT prophy: SCDs if patient allows  - GI prophy: none  - Lines: PIV  - Tubes: None  - Code: DNR    Dispo: Inpatient for further workup of likely metastatic lung cancer, leukocytosis, hypercalcemia     none

## 2024-02-22 NOTE — ED ADULT NURSE NOTE - NSFALLUNIVINTERV_ED_ALL_ED
Bed/Stretcher in lowest position, wheels locked, appropriate side rails in place/Call bell, personal items and telephone in reach/Instruct patient to call for assistance before getting out of bed/chair/stretcher/Non-slip footwear applied when patient is off stretcher/[place] to call system/Physically safe environment - no spills, clutter or unnecessary equipment/Purposeful proactive rounding/Room/bathroom lighting operational, light cord in reach

## 2024-02-22 NOTE — ED PROVIDER NOTE - PROGRESS NOTE DETAILS
Pain improved, x-ray comparable to prior x-ray done in July.  Will give patient return precautions and follow-up instructions with teach back.  Patient already has an appointment with orthopedics next week.  Went for RACHEL. Gildardo Urena, ED Attending

## 2024-02-22 NOTE — ED PROVIDER NOTE - NSFOLLOWUPINSTRUCTIONS_ED_ALL_ED_FT
See attached information on shoulder pain.     Follow-up with the orthopedic clinic and the spine center, information provided.      Return to the emergency room for any new or worsening symptoms.      You can take 400 mg of Motrin  3 times a day with food and 650mg  of Tylenol 4 times a day for pain.     Buy Salonpas 4% lidocaine patch. Place over area of greatest pain.  Apply for 12 hours at a time.  Do not use more than 2 patches per day.     If not improved in 2 weeks, follow with either:     [facility] (comprehensive, with Neurology, Orthopedic Surgery, Neurosurgery, and Physical Therapy): [phone].     Or Spine Surgeon Dr. Lopez:    [phone]  [email or web address]  Email: [email or web address]    1. [place] Address: [address] [facility] [address] [facility], [address]. Hours: Mon-Fri 9am - 5pm and every 2nd Sat 9am - 3pm.    2. [place] Address: [address] [facility] [address] [place] [address] (Inside [facility]). Hours: Every Thursday 10am - 5pm.    3. [place] Address: [address] [facility] [facility] [address] (Inside [facility]). Hours: 10am - 5pm Every Tuesday (except 2nd) & Every 2nd Wednesday. See attached information on shoulder pain.     Follow-up with the orthopedic clinic and the spine center, information provided.      Return to the emergency room for any new or worsening symptoms.      You can take 400 mg of Motrin  3 times a day with food and 650mg  of Tylenol 4 times a day for pain.     Buy Salonpas 4% lidocaine patch. Place over area of greatest pain.  Apply for 12 hours at a time.  Do not use more than 2 patches per day.     If not improved in 2 weeks, follow with either:     [facility] (comprehensive, with Neurology, Orthopedic Surgery, Neurosurgery, and Physical Therapy): [phone].     Or Spine Surgeon Dr. Lopez:    [phone]  [email or web address]  Email: [email or web address]    1. [place] Address: [address] [facility] [address] [facility], [address]. Hours: Mon-Fri 9am - 5pm and every 2nd Sat 9am - 3pm.    2. [place] Address: [address] [facility] [address] [place] [address] (Inside [facility]). Hours: Every Thursday 10am - 5pm.    3. [place] Address: [address] [facility] [facility] [address] (Inside [facility]). Hours: 10am - 5pm Every Tuesday (except 2nd) & Every 2nd Wednesday.    Shoulder Pain  Many things can cause shoulder pain, including:  An injury to the shoulder.  Overuse of the shoulder.  Arthritis.  The source of the pain can be:  Inflammation.  An injury to the shoulder joint.  An injury to a tendon, ligament, or bone.  Follow these instructions at home:  Pay attention to changes in your symptoms. Let your health care provider know about them. Follow these instructions to relieve your pain.    If you have a removable sling:    Wear the sling as told by your provider. Remove it only as told by your provider.  Check the skin around the sling every day. Tell your provider about any concerns.  Loosen the sling if your fingers tingle, become numb, or become cold.  Keep the sling clean.  If the sling is not waterproof:  Do not let it get wet.  Remove it to shower or bathe.  Move your arm as little as possible, but keep your hand moving to prevent swelling.  Managing pain, stiffness, and swelling    Bag of ice on a towel on the skin.  If told, put ice on the painful area.  If you have a removable sling or immobilizer, remove it as told by your provider.  Put ice in a plastic bag.  Place a towel between your skin and the bag.  Leave the ice on for 20 minutes, 2–3 times a day.  If your skin turns bright red, remove the ice right away to prevent skin damage. The risk of damage is higher if you cannot feel pain, heat, or cold.  Move your fingers often to reduce stiffness and swelling.  Squeeze a soft ball or a foam pad as much as possible. This helps to keep the shoulder from swelling. It also helps to strengthen the arm.  General instructions    Take over-the-counter and prescription medicines only as told by your provider.  Exercise may help with pain management. Perform exercises if told by your provider.  You may be referred to a physical therapist to help in your recovery process.  Keep all follow-up visits in order to avoid any type of permanent shoulder disability or chronic pain problems.  Contact a health care provider if:  Your pain is not relieved with medicines.  New pain develops in your arm, hand, or fingers.  You loosen your sling and your arm, hand, or fingers remain tingly, numb, swollen, or painful.  Get help right away if:  Your arm, hand, or fingers turn white or blue.  This information is not intended to replace advice given to you by your health care provider. Make sure you discuss any questions you have with your health care provider.

## 2024-02-22 NOTE — ED ADULT NURSE NOTE - NS ED NURSE LEVEL OF CONSCIOUSNESS ORIENTATION
Oriented - self; Oriented - place; Oriented - time Curettage Text: The wound bed was treated with curettage after the biopsy was performed.

## 2024-02-22 NOTE — ED PROVIDER NOTE - OBJECTIVE STATEMENT
Otherwise healthy 44-year-old female presenting with chief complaint of right-sided shoulder pain and arm pain.  Of note patient seen for the same problem in July 2023.  Patient had a comprehensive workup with DVT study, x-ray of the shoulder, CT of the C-spine.  CT showed degenerative changes, x-ray showed arthritis of the right shoulder.  No actionable findings.  Patient was given orthopedics follow-up, but states that she did not follow-up.  The pain this time started about 4 days ago.  Patient is right-hand dominant.  States that she has been using her arms more recently.  No inciting event or trauma.  No other complaints at this time.

## 2024-02-26 ENCOUNTER — APPOINTMENT (OUTPATIENT)
Dept: ORTHOPEDIC SURGERY | Facility: CLINIC | Age: 44
End: 2024-02-26

## 2024-03-27 ENCOUNTER — NON-APPOINTMENT (OUTPATIENT)
Age: 44
End: 2024-03-27

## 2024-03-28 ENCOUNTER — APPOINTMENT (OUTPATIENT)
Dept: INTERNAL MEDICINE | Facility: CLINIC | Age: 44
End: 2024-03-28
Payer: MEDICAID

## 2024-03-28 ENCOUNTER — LABORATORY RESULT (OUTPATIENT)
Age: 44
End: 2024-03-28

## 2024-03-28 ENCOUNTER — NON-APPOINTMENT (OUTPATIENT)
Age: 44
End: 2024-03-28

## 2024-03-28 VITALS
HEART RATE: 86 BPM | TEMPERATURE: 98.2 F | BODY MASS INDEX: 26.9 KG/M2 | OXYGEN SATURATION: 98 % | RESPIRATION RATE: 16 BRPM | SYSTOLIC BLOOD PRESSURE: 96 MMHG | DIASTOLIC BLOOD PRESSURE: 69 MMHG | WEIGHT: 137 LBS | HEIGHT: 60 IN

## 2024-03-28 DIAGNOSIS — Z82.49 FAMILY HISTORY OF ISCHEMIC HEART DISEASE AND OTHER DISEASES OF THE CIRCULATORY SYSTEM: ICD-10-CM

## 2024-03-28 DIAGNOSIS — Z78.9 OTHER SPECIFIED HEALTH STATUS: ICD-10-CM

## 2024-03-28 DIAGNOSIS — R42 DIZZINESS AND GIDDINESS: ICD-10-CM

## 2024-03-28 DIAGNOSIS — Z00.00 ENCOUNTER FOR GENERAL ADULT MEDICAL EXAMINATION W/OUT ABNORMAL FINDINGS: ICD-10-CM

## 2024-03-28 PROCEDURE — 99386 PREV VISIT NEW AGE 40-64: CPT | Mod: 25

## 2024-03-28 PROCEDURE — 93000 ELECTROCARDIOGRAM COMPLETE: CPT

## 2024-03-28 PROCEDURE — 99203 OFFICE O/P NEW LOW 30 MIN: CPT | Mod: 25

## 2024-03-28 NOTE — PLAN
[FreeTextEntry1] : Reviewed age appropriate preventive screening with patient today and importance of regular screening as indicated. Encouraged exercise of at least 30 mins daily of moderate activity as tolerated.  If unable to participate in moderate activity, encouraged walking daily for 20 to 30mins. Discussed healthy dietary intake of vegetables, whole grains, lean proteins with avoidance of high sugar and sodium intake. Completed labs in office today, will await results and notify patient accordingly Reviewed and assessed depression screening and mood today with patient  -EKG, TSH, labs for dizziness -Referral for GI for screening colonoscopy

## 2024-03-28 NOTE — HEALTH RISK ASSESSMENT
[Patient reported mammogram was normal] : Patient reported mammogram was normal [Feels Safe at Home] : Feels safe at home [Fully functional (bathing, dressing, toileting, transferring, walking, feeding)] : Fully functional (bathing, dressing, toileting, transferring, walking, feeding) [Never] : Never [Change in mental status noted] : No change in mental status noted [Reports changes in hearing] : Reports no changes in hearing [Reports changes in vision] : Reports no changes in vision [Reports changes in dental health] : Reports no changes in dental health [MammogramDate] : 05/23 [PapSmearDate] : 05/23 [ColonoscopyComments] : None prior

## 2024-03-28 NOTE — HISTORY OF PRESENT ILLNESS
[FreeTextEntry1] : NP CPE Dizziness [de-identified] : Here for CPE  Also with complaint of dizziness, occurring intermittently over last 2-3 weeks. States no associated focal deficits, headaches, palpitations. States dizziness last up to one hour and resolves with rest Has been fasting, but feels dizziness started prior to this No reproduction or worsening with movement Denies hearing impairment

## 2024-04-02 LAB
ALBUMIN SERPL ELPH-MCNC: 4.4 G/DL
ALP BLD-CCNC: 82 U/L
ALT SERPL-CCNC: 12 U/L
ANION GAP SERPL CALC-SCNC: 13 MMOL/L
APPEARANCE: CLEAR
AST SERPL-CCNC: 13 U/L
BASOPHILS # BLD AUTO: 0.04 K/UL
BASOPHILS NFR BLD AUTO: 0.9 %
BILIRUB SERPL-MCNC: 0.3 MG/DL
BILIRUBIN URINE: NEGATIVE
BLOOD URINE: ABNORMAL
BUN SERPL-MCNC: 14 MG/DL
CALCIUM SERPL-MCNC: 9.3 MG/DL
CHLORIDE SERPL-SCNC: 105 MMOL/L
CHOLEST SERPL-MCNC: 203 MG/DL
CO2 SERPL-SCNC: 20 MMOL/L
COLOR: YELLOW
CREAT SERPL-MCNC: 0.51 MG/DL
EGFR: 117 ML/MIN/1.73M2
EOSINOPHIL # BLD AUTO: 0.09 K/UL
EOSINOPHIL NFR BLD AUTO: 2.1 %
ESTIMATED AVERAGE GLUCOSE: 103 MG/DL
GLUCOSE QUALITATIVE U: NEGATIVE MG/DL
GLUCOSE SERPL-MCNC: 101 MG/DL
HBA1C MFR BLD HPLC: 5.2 %
HCT VFR BLD CALC: 38.5 %
HDLC SERPL-MCNC: 65 MG/DL
HGB BLD-MCNC: 12.7 G/DL
IMM GRANULOCYTES NFR BLD AUTO: 0.5 %
KETONES URINE: NEGATIVE MG/DL
LDLC SERPL CALC-MCNC: 126 MG/DL
LEUKOCYTE ESTERASE URINE: NEGATIVE
LYMPHOCYTES # BLD AUTO: 1.48 K/UL
LYMPHOCYTES NFR BLD AUTO: 34.6 %
MAN DIFF?: NORMAL
MCHC RBC-ENTMCNC: 30.5 PG
MCHC RBC-ENTMCNC: 33 GM/DL
MCV RBC AUTO: 92.3 FL
MONOCYTES # BLD AUTO: 0.49 K/UL
MONOCYTES NFR BLD AUTO: 11.4 %
NEUTROPHILS # BLD AUTO: 2.16 K/UL
NEUTROPHILS NFR BLD AUTO: 50.5 %
NITRITE URINE: NEGATIVE
NONHDLC SERPL-MCNC: 138 MG/DL
PH URINE: 6.5
PLATELET # BLD AUTO: 205 K/UL
POTASSIUM SERPL-SCNC: 4.1 MMOL/L
PROT SERPL-MCNC: 6.9 G/DL
PROTEIN URINE: NEGATIVE MG/DL
RBC # BLD: 4.17 M/UL
RBC # FLD: 13.1 %
SODIUM SERPL-SCNC: 138 MMOL/L
SPECIFIC GRAVITY URINE: 1.02
TRIGL SERPL-MCNC: 64 MG/DL
TSH SERPL-ACNC: 1.17 UIU/ML
UROBILINOGEN URINE: 0.2 MG/DL
WBC # FLD AUTO: 4.28 K/UL

## 2024-04-25 ENCOUNTER — APPOINTMENT (OUTPATIENT)
Dept: GASTROENTEROLOGY | Facility: CLINIC | Age: 44
End: 2024-04-25
Payer: MEDICAID

## 2024-04-25 VITALS
BODY MASS INDEX: 27.88 KG/M2 | HEART RATE: 78 BPM | OXYGEN SATURATION: 98 % | SYSTOLIC BLOOD PRESSURE: 103 MMHG | WEIGHT: 142 LBS | DIASTOLIC BLOOD PRESSURE: 72 MMHG | HEIGHT: 60 IN

## 2024-04-25 DIAGNOSIS — R10.13 EPIGASTRIC PAIN: ICD-10-CM

## 2024-04-25 DIAGNOSIS — Z12.11 ENCOUNTER FOR SCREENING FOR MALIGNANT NEOPLASM OF COLON: ICD-10-CM

## 2024-04-25 PROCEDURE — 99204 OFFICE O/P NEW MOD 45 MIN: CPT

## 2024-04-25 NOTE — PHYSICAL EXAM
[Alert] : alert [Normal Voice/Communication] : normal voice/communication [Healthy Appearing] : healthy appearing [No Acute Distress] : no acute distress [Sclera] : the sclera and conjunctiva were normal [Hearing Threshold Finger Rub Not [place]] : hearing was normal [Normal Lips/Gums] : the lips and gums were normal [Oropharynx] : the oropharynx was normal [Normal Appearance] : the appearance of the neck was normal [No Neck Mass] : no neck mass was observed [No Respiratory Distress] : no respiratory distress [No Acc Muscle Use] : no accessory muscle use [Respiration, Rhythm And Depth] : normal respiratory rhythm and effort [Auscultation Breath Sounds / Voice Sounds] : lungs were clear to auscultation bilaterally [Heart Rate And Rhythm] : heart rate was normal and rhythm regular [Murmurs] : no murmurs [Normal S1, S2] : normal S1 and S2 [Bowel Sounds] : normal bowel sounds [Abdomen Tenderness] : non-tender [No Masses] : no abdominal mass palpated [] : no hepatosplenomegaly [Abdomen Soft] : soft [Oriented To Time, Place, And Person] : oriented to person, place, and time

## 2024-04-25 NOTE — ASSESSMENT
[FreeTextEntry1] : patient presents for colon cancer screening   will schedule for colonoscopy at [facility]   thank you  for referring patient for colon cancer screening

## 2024-04-25 NOTE — CONSULT LETTER
[Dear  ___] : Dear  [unfilled], [Consult Letter:] : I had the pleasure of evaluating your patient, [unfilled]. [( Thank you for referring [unfilled] for consultation for _____ )] : Thank you for referring [unfilled] for consultation for [unfilled] [FreeTextEntry2] : Dr. Dotty Cohn MD [facility] Physician [facility]

## 2024-04-26 ENCOUNTER — NON-APPOINTMENT (OUTPATIENT)
Age: 44
End: 2024-04-26

## 2024-05-21 ENCOUNTER — APPOINTMENT (OUTPATIENT)
Dept: GASTROENTEROLOGY | Facility: GI CENTER | Age: 44
End: 2024-05-21

## 2025-06-29 NOTE — PROCEDURE
[de-identified] : Attending note. Using ultrasound guidance the intersection of the first and second dorsal compartments was injected with 1 cc of Depo-Medrol +1 cc of plain lidocaine with relief the patient's symptoms in the wrist. [Injection] : Injection [Right] : on the right.   [Inflammation] : Inflammation [Patient] : patient [Risk] : Risk [Benefits] : benefits [Alternatives] : alternatives [Bleeding] : bleeding [Infection] : infection [Allergic Reaction] : allergic reaction [Verbal Consent Obtained] : verbal consent was obtained prior to the procedure [Alcohol] : Alcohol [Betadine] : Betadine [Ethyl Chloride Spray] : ethyl chloride spray was used as a topical anesthetic [Direct] : direct [22] : a 22-gauge [1% Lidocaine___(mL)] : [unfilled] mL of 1% Lidocaine No [Methylpred. 40mg/mL___(mL)] : [unfilled] mL 40mg/mL methylprednisolone [Bandage Applied] : a bandage [Tolerated Well] : The patient tolerated the procedure well [None] : None [___ Week(s)] : in [unfilled] week(s) [de-identified] : wrist/APL

## 2025-08-06 ENCOUNTER — NON-APPOINTMENT (OUTPATIENT)
Age: 45
End: 2025-08-06

## 2025-08-07 ENCOUNTER — APPOINTMENT (OUTPATIENT)
Dept: INTERNAL MEDICINE | Facility: CLINIC | Age: 45
End: 2025-08-07
Payer: MEDICAID

## 2025-08-07 VITALS
SYSTOLIC BLOOD PRESSURE: 103 MMHG | HEIGHT: 60 IN | OXYGEN SATURATION: 94 % | TEMPERATURE: 98.9 F | HEART RATE: 97 BPM | BODY MASS INDEX: 28.86 KG/M2 | WEIGHT: 147 LBS | RESPIRATION RATE: 16 BRPM | DIASTOLIC BLOOD PRESSURE: 69 MMHG

## 2025-08-07 DIAGNOSIS — G56.03 CARPAL TUNNEL SYNDROM,BILATERAL UPPER LIMBS: ICD-10-CM

## 2025-08-07 DIAGNOSIS — Z12.11 ENCOUNTER FOR SCREENING FOR MALIGNANT NEOPLASM OF COLON: ICD-10-CM

## 2025-08-07 DIAGNOSIS — R53.83 OTHER FATIGUE: ICD-10-CM

## 2025-08-07 DIAGNOSIS — R05.8 OTHER SPECIFIED COUGH: ICD-10-CM

## 2025-08-07 DIAGNOSIS — Z00.00 ENCOUNTER FOR GENERAL ADULT MEDICAL EXAMINATION W/OUT ABNORMAL FINDINGS: ICD-10-CM

## 2025-08-07 DIAGNOSIS — Z87.898 PERSONAL HISTORY OF OTHER SPECIFIED CONDITIONS: ICD-10-CM

## 2025-08-07 DIAGNOSIS — M25.511 PAIN IN RIGHT SHOULDER: ICD-10-CM

## 2025-08-07 PROCEDURE — 99213 OFFICE O/P EST LOW 20 MIN: CPT | Mod: 25

## 2025-08-07 PROCEDURE — 99396 PREV VISIT EST AGE 40-64: CPT

## 2025-08-08 RX ORDER — LEVOCETIRIZINE DIHYDROCHLORIDE 5 MG/1
5 TABLET ORAL
Qty: 90 | Refills: 1 | Status: ACTIVE | COMMUNITY
Start: 2025-08-08 | End: 1900-01-01

## 2025-08-11 ENCOUNTER — NON-APPOINTMENT (OUTPATIENT)
Age: 45
End: 2025-08-11

## 2025-08-11 LAB
25(OH)D3 SERPL-MCNC: 35.7 NG/ML
ALBUMIN SERPL ELPH-MCNC: 4.6 G/DL
ALP BLD-CCNC: 114 U/L
ALT SERPL-CCNC: 17 U/L
ANION GAP SERPL CALC-SCNC: 12 MMOL/L
AST SERPL-CCNC: 20 U/L
BILIRUB SERPL-MCNC: 0.3 MG/DL
BUN SERPL-MCNC: 15 MG/DL
CALCIUM SERPL-MCNC: 10.2 MG/DL
CHLORIDE SERPL-SCNC: 103 MMOL/L
CHOLEST SERPL-MCNC: 235 MG/DL
CO2 SERPL-SCNC: 23 MMOL/L
CREAT SERPL-MCNC: 0.49 MG/DL
EGFRCR SERPLBLD CKD-EPI 2021: 117 ML/MIN/1.73M2
ESTIMATED AVERAGE GLUCOSE: 108 MG/DL
FERRITIN SERPL-MCNC: 41 NG/ML
GLUCOSE SERPL-MCNC: 99 MG/DL
HBA1C MFR BLD HPLC: 5.4 %
HCT VFR BLD CALC: 38.7 %
HDLC SERPL-MCNC: 58 MG/DL
HGB BLD-MCNC: 12.8 G/DL
IRON SERPL-MCNC: 106 UG/DL
LDLC SERPL-MCNC: 149 MG/DL
MCHC RBC-ENTMCNC: 30.3 PG
MCHC RBC-ENTMCNC: 33.1 G/DL
MCV RBC AUTO: 91.7 FL
NONHDLC SERPL-MCNC: 176 MG/DL
PLATELET # BLD AUTO: 210 K/UL
POTASSIUM SERPL-SCNC: 4.1 MMOL/L
PROT SERPL-MCNC: 7.3 G/DL
RBC # BLD: 4.22 M/UL
RBC # FLD: 12.9 %
SODIUM SERPL-SCNC: 138 MMOL/L
TRIGL SERPL-MCNC: 152 MG/DL
TSH SERPL-ACNC: 0.93 UIU/ML
VIT B12 SERPL-MCNC: 845 PG/ML
WBC # FLD AUTO: 5.41 K/UL

## 2025-08-26 ENCOUNTER — NON-APPOINTMENT (OUTPATIENT)
Age: 45
End: 2025-08-26